# Patient Record
Sex: FEMALE | Race: WHITE | NOT HISPANIC OR LATINO | Employment: OTHER | URBAN - NONMETROPOLITAN AREA
[De-identification: names, ages, dates, MRNs, and addresses within clinical notes are randomized per-mention and may not be internally consistent; named-entity substitution may affect disease eponyms.]

---

## 2017-11-07 ENCOUNTER — OFFICE VISIT (OUTPATIENT)
Dept: OTOLARYNGOLOGY | Facility: CLINIC | Age: 67
End: 2017-11-07

## 2017-11-07 VITALS — WEIGHT: 207 LBS | HEIGHT: 61 IN | TEMPERATURE: 97.4 F | BODY MASS INDEX: 39.08 KG/M2

## 2017-11-07 DIAGNOSIS — R22.1 NECK MASS: Primary | ICD-10-CM

## 2017-11-07 DIAGNOSIS — H61.21 CERUMEN DEBRIS ON TYMPANIC MEMBRANE OF RIGHT EAR: ICD-10-CM

## 2017-11-07 DIAGNOSIS — H68.021 EUSTACHIAN SALPINGITIS, CHRONIC, RIGHT: ICD-10-CM

## 2017-11-07 PROCEDURE — 99204 OFFICE O/P NEW MOD 45 MIN: CPT | Performed by: OTOLARYNGOLOGY

## 2017-11-07 RX ORDER — LOSARTAN POTASSIUM 100 MG/1
TABLET ORAL
COMMUNITY
Start: 2017-11-06

## 2017-11-07 RX ORDER — LEVOTHYROXINE SODIUM 0.1 MG/1
100 TABLET ORAL DAILY
COMMUNITY
Start: 2017-09-26

## 2017-11-07 RX ORDER — LORATADINE 10 MG/1
10 TABLET ORAL NIGHTLY
COMMUNITY

## 2017-11-07 RX ORDER — ATORVASTATIN CALCIUM 10 MG/1
10 TABLET, FILM COATED ORAL NIGHTLY
COMMUNITY
Start: 2017-08-24

## 2017-11-07 RX ORDER — TRIAMTERENE AND HYDROCHLOROTHIAZIDE 37.5; 25 MG/1; MG/1
1 TABLET ORAL DAILY
COMMUNITY
Start: 2017-11-06

## 2017-11-07 RX ORDER — CLOPIDOGREL BISULFATE 75 MG/1
75 TABLET ORAL DAILY
COMMUNITY
Start: 2017-11-06

## 2017-11-07 RX ORDER — PHENOL 1.4 %
600 AEROSOL, SPRAY (ML) MUCOUS MEMBRANE 2 TIMES DAILY WITH MEALS
COMMUNITY

## 2017-11-07 NOTE — PROGRESS NOTES
Subjective   Leola Moore is a 67 y.o. female.   C:persistent right neck mass  History of Present Illness     Examination has a right neck mass was treated with antibiotics and may result slightlyat the same is nontender not painful she's not any fever chills.  She does have history of breast cancer.  She also has a history of ear problems 73 in her ear as well on the right side.  He's had trouble with wax buildup because a small ear canals.  She denies sore throat denies trouble swallowing she denies voice changes denies any dental or jaw or skin problems.  Has hearing loss or right ear had a tube placed about 8 years ago by another doctor.  Tube does not come out she's not had any drainage she has long-standing hearing loss slowly worsening  The following portions of the patient's history were reviewed and updated as appropriate: allergies, current medications, past family history, past medical history, past social history, past surgical history and problem list.      Leola Moore reports that she has never smoked. She has never used smokeless tobacco. She reports that she does not drink alcohol.  Patient is not a tobacco user and has been counseled for use of tobacco products    Family History   Problem Relation Age of Onset   • Heart disease Mother    • Heart disease Father          Current Outpatient Prescriptions:   •  atorvastatin (LIPITOR) 10 MG tablet, , Disp: , Rfl:   •  calcium carbonate (OS-ANNE MARIE) 600 MG tablet, Take 600 mg by mouth 2 (Two) Times a Day With Meals., Disp: , Rfl:   •  clopidogrel (PLAVIX) 75 MG tablet, , Disp: , Rfl:   •  levothyroxine (SYNTHROID, LEVOTHROID) 100 MCG tablet, , Disp: , Rfl:   •  loratadine (CLARITIN) 10 MG tablet, Take 10 mg by mouth Daily., Disp: , Rfl:   •  losartan (COZAAR) 100 MG tablet, , Disp: , Rfl:   •  Multiple Vitamins-Minerals (MULTIVITAMIN ADULT PO), Take  by mouth., Disp: , Rfl:   •  triamterene-hydrochlorothiazide (MAXZIDE-25) 37.5-25 MG per tablet, ,  Disp: , Rfl:     No Known Allergies    Past Medical History:   Diagnosis Date   • Breast cancer    • Disease of thyroid gland    • Stroke          Review of Systems   Constitutional: Negative for fever.   HENT: Positive for hearing loss. Negative for ear pain, facial swelling, trouble swallowing and voice change.    Neurological: Negative for facial asymmetry.   Hematological: Positive for adenopathy. Bruises/bleeds easily.   All other systems reviewed and are negative.          Objective   Physical Exam   Constitutional: She is oriented to person, place, and time. She appears well-developed and well-nourished.   HENT:   Head: Normocephalic and atraumatic.   Right Ear: Hearing and external ear normal. No mastoid tenderness. Tympanic membrane is not scarred.   Left Ear: Hearing, tympanic membrane, external ear and ear canal normal.   Ears:    Nose: Nose normal. No mucosal edema, rhinorrhea, nasal deformity or septal deviation. No epistaxis. Right sinus exhibits no maxillary sinus tenderness and no frontal sinus tenderness. Left sinus exhibits no maxillary sinus tenderness and no frontal sinus tenderness.   Mouth/Throat: Uvula is midline and oropharynx is clear and moist. She has dentures. No trismus in the jaw. Normal dentition. No lacerations or dental caries. No oropharyngeal exudate or posterior oropharyngeal edema. Tonsils are 2+ on the right. Tonsils are 2+ on the left. No tonsillar exudate.   Eyes: Conjunctivae and EOM are normal.   Neck: Normal range of motion. Neck supple. No JVD present. No tracheal deviation present. No thyromegaly present.   Cardiovascular: Normal rate.    Pulmonary/Chest: Effort normal.   Musculoskeletal: Normal range of motion.   Lymphadenopathy:        Head (right side): No submental, no submandibular, no tonsillar, no preauricular, no posterior auricular and no occipital adenopathy present.        Head (left side): No submental, no submandibular, no tonsillar, no preauricular, no  posterior auricular and no occipital adenopathy present.     She has no cervical adenopathy.        Right cervical: No superficial cervical, no deep cervical and no posterior cervical adenopathy present.       Left cervical: No superficial cervical, no deep cervical and no posterior cervical adenopathy present.   Neurological: She is alert and oriented to person, place, and time. No cranial nerve deficit.   Skin: Skin is warm.   Psychiatric: She has a normal mood and affect. Her speech is normal and behavior is normal. Thought content normal.   Nursing note and vitals reviewed.    Cerumen was removed from the ear canal with use of microscope and forceps without much difficulty      The previously done ultrasound which showed a large mass and what appeared to be several lymph nodes around the right sub-mental/submandibular area was reviewed.  Linings were discussed and reviewed with the patient and his and her   Assessment/Plan   Leola was seen today for swollen glands.    Diagnoses and all orders for this visit:    Neck mass  -     US Guided Fine Needle Aspiration; Future    Concern this may be a malignancy and that is very firm and  immobile  Explained her that there 3 options 1 is observation, number to this FNA, #3 is excisional biopsy.  If we can get the information need to find out with the cyst to begin treatment based on take that approach is at least invasive.  Discussed the risk of each approach there agree with this.  And some likely other antibiotics are likely be helpful.  I'm concerned because this is a nontender mass with no obvious source.  We discussed her ear tube an option for that I cleaned out the wax of ear and suggest that we do with the neck mass person and consideration of the PE tube removal was one option we discussed the consequences of that

## 2017-11-13 ENCOUNTER — HOSPITAL ENCOUNTER (OUTPATIENT)
Dept: ULTRASOUND IMAGING | Facility: HOSPITAL | Age: 67
Discharge: HOME | End: 2017-11-13
Admitting: OTOLARYNGOLOGY

## 2017-11-13 DIAGNOSIS — R22.1 NECK MASS: ICD-10-CM

## 2017-11-13 PROCEDURE — 88173 CYTOPATH EVAL FNA REPORT: CPT | Performed by: PATHOLOGY

## 2017-11-13 PROCEDURE — 76942 ECHO GUIDE FOR BIOPSY: CPT

## 2017-11-13 PROCEDURE — 88173 CYTOPATH EVAL FNA REPORT: CPT | Performed by: OTOLARYNGOLOGY

## 2017-11-13 NOTE — PRE-PROCEDURE NOTE
Patient presents for FNA of submandibular nodule. Procedure, risks , and benefits discussed with patient and patient  gave informed consent.  H&P dated 11/7/2017 reviewed with no changes.

## 2017-11-15 ENCOUNTER — PREP FOR SURGERY (OUTPATIENT)
Dept: OTHER | Facility: HOSPITAL | Age: 67
End: 2017-11-15

## 2017-11-15 ENCOUNTER — OFFICE VISIT (OUTPATIENT)
Dept: OTOLARYNGOLOGY | Facility: CLINIC | Age: 67
End: 2017-11-15

## 2017-11-15 VITALS — TEMPERATURE: 97 F | HEIGHT: 61 IN | BODY MASS INDEX: 39.46 KG/M2 | WEIGHT: 209 LBS

## 2017-11-15 DIAGNOSIS — C85.90 LYMPHOMA (HCC): ICD-10-CM

## 2017-11-15 DIAGNOSIS — C85.91 LYMPHOMA OF LYMPH NODES OF NECK, UNSPECIFIED LYMPHOMA TYPE (HCC): ICD-10-CM

## 2017-11-15 DIAGNOSIS — R22.1 NECK MASS: Primary | ICD-10-CM

## 2017-11-15 DIAGNOSIS — R59.0 CERVICAL ADENOPATHY: Primary | ICD-10-CM

## 2017-11-15 LAB
LAB AP CASE REPORT: NORMAL
LAB AP NON-GYN SPECIMEN ADEQUACY: NORMAL
Lab: NORMAL
PATH REPORT.FINAL DX SPEC: NORMAL
PATH REPORT.GROSS SPEC: NORMAL

## 2017-11-15 PROCEDURE — 99214 OFFICE O/P EST MOD 30 MIN: CPT | Performed by: OTOLARYNGOLOGY

## 2017-11-15 NOTE — PROGRESS NOTES
Subjective   Leola Moore is a 67 y.o. female.   F/u persistent right neck mass  History of Present Illness   Patient comes back in follow-up for persistent right neck mass.  She's had history of breast cancer in the past.  She is otherwise healthy doesn't seem to be have any major swallowing or voice problems doesn't have any other nodes she is aware of.  She underwent chemotherapy and radiation treatments in [place] the past and seen Dr. Apodaca's group.  His not noticed any progression the size of mass and recently when through the needle biopsy.   Comes back review of pathology.      The following portions of the patient's history were reviewed and updated as appropriate: allergies, current medications, past family history, past medical history, past social history, past surgical history and problem list.      Leola Moore reports that she has never smoked. She has never used smokeless tobacco. She reports that she does not drink alcohol.  Patient is not a tobacco user and has not been counseled for use of tobacco products    Family History   Problem Relation Age of Onset   • Heart disease Mother    • Heart disease Father          Current Outpatient Prescriptions:   •  atorvastatin (LIPITOR) 10 MG tablet, , Disp: , Rfl:   •  calcium carbonate (OS-ANNE MARIE) 600 MG tablet, Take 600 mg by mouth 2 (Two) Times a Day With Meals., Disp: , Rfl:   •  clopidogrel (PLAVIX) 75 MG tablet, , Disp: , Rfl:   •  levothyroxine (SYNTHROID, LEVOTHROID) 100 MCG tablet, , Disp: , Rfl:   •  loratadine (CLARITIN) 10 MG tablet, Take 10 mg by mouth Daily., Disp: , Rfl:   •  losartan (COZAAR) 100 MG tablet, , Disp: , Rfl:   •  Multiple Vitamins-Minerals (MULTIVITAMIN ADULT PO), Take  by mouth., Disp: , Rfl:   •  triamterene-hydrochlorothiazide (MAXZIDE-25) 37.5-25 MG per tablet, , Disp: , Rfl:     No Known Allergies    Past Medical History:   Diagnosis Date   • Breast cancer    • Disease of thyroid gland    • Stroke          Review of  Systems   Constitutional: Negative for fever.   HENT: Negative for ear pain, facial swelling, trouble swallowing and voice change.    Respiratory: Negative for shortness of breath.    Hematological: Positive for adenopathy.           Objective   Physical Exam   Constitutional: She is oriented to person, place, and time. She appears well-developed and well-nourished.   HENT:   Head: Normocephalic and atraumatic.   Right Ear: Hearing and external ear normal. No mastoid tenderness. Tympanic membrane is not scarred.   Left Ear: Hearing, tympanic membrane, external ear and ear canal normal.   Ears:    Nose: Nose normal. No mucosal edema, rhinorrhea, nasal deformity or septal deviation. No epistaxis. Right sinus exhibits no maxillary sinus tenderness and no frontal sinus tenderness. Left sinus exhibits no maxillary sinus tenderness and no frontal sinus tenderness.   Mouth/Throat: Uvula is midline and oropharynx is clear and moist. She has dentures. No trismus in the jaw. Normal dentition. No lacerations or dental caries. No oropharyngeal exudate or posterior oropharyngeal edema. Tonsils are 2+ on the right. Tonsils are 2+ on the left. No tonsillar exudate.   Eyes: Conjunctivae and EOM are normal.   Neck: Trachea normal, normal range of motion and phonation normal. Neck supple. No JVD present. No tracheal deviation present. No thyroid mass and no thyromegaly present.   Cardiovascular: Normal rate.    Pulmonary/Chest: Effort normal.   Musculoskeletal: Normal range of motion.   Lymphadenopathy:        Head (right side): Submental and submandibular adenopathy present. No tonsillar, no preauricular, no posterior auricular and no occipital adenopathy present.        Head (left side): No submental, no submandibular, no tonsillar, no preauricular, no posterior auricular and no occipital adenopathy present.     She has cervical adenopathy.        Right cervical: Deep cervical adenopathy present. No superficial cervical and no  posterior cervical adenopathy present.       Left cervical: No superficial cervical, no deep cervical and no posterior cervical adenopathy present.   Neurological: She is alert and oriented to person, place, and time. No cranial nerve deficit.   Skin: Skin is warm.   Psychiatric: She has a normal mood and affect. Her speech is normal and behavior is normal. Thought content normal.   Nursing note and vitals reviewed.    OR Specimen ID: [id number]  Specimen Description: right neck   Fine Needle Aspiration - Fine Needle Aspirate, Neck   Order: [id number]   Status:  Final result   Visible to patient:  No (Not Released) Dx:  Neck mass      2d ago     Final Diagnosis   Mass, Right Neck, FNA:     Few, isolated, ATYPICAL LYMPHOID CELLS suspicious for a LYMPHOMA     Further evaluation is recommended           Electronically signed by Tyree Sevilla MD on 11/15/2017 at 0823   Specimen Adequacy Satisfactory for evaluation   Gross Description       Fine Needle Aspiration smears. No cell block      Resulting Agency [facility]      Specimen Collected: 11/13/17  2:05 PM Last Resulted: 11/15/17  8:23 AM                Scans on Order [id number]   Document on 11/15/2017  9:24 AM by Tyree Sevilla MD                         Assessment/Plan   Leola was seen today for follow-up.    Diagnoses and all orders for this visit:    Neck mass    Lymphoma of lymph nodes of neck, unspecified lymphoma type      I discussed the findings with the patient explained her that we need additional tissue to confirm the diagnosis and findings tissue type.  Discussed the surgeries not the treatment for this that is required to get an adequate tissue.  Then an oncologist would make decisions about what type of treatment options with a chemoradiation therapy or other treatments would be appropriate.  Discussed the risk of surgery the risk of bleeding, infection scarring facial paralysis numbness need for further surgery nerve need for further treatment. The plan  is to excise with the deep nodes in the to  submental area under general anesthesia with nerve dissection of possible.  The patient tissue be sent off and she will be referred appropriate oncology as needed.  Understand what's involved the risks benefits, occasions discuss her family and all questions answered she prefers to see her her oncologist she she seen for breast cancer in the past and will arrange that once we have the tissue diagnosis are arranging in the near future

## 2017-11-17 ENCOUNTER — APPOINTMENT (OUTPATIENT)
Dept: PREADMISSION TESTING | Facility: HOSPITAL | Age: 67
End: 2017-11-17

## 2017-11-17 VITALS
HEIGHT: 61 IN | WEIGHT: 205 LBS | SYSTOLIC BLOOD PRESSURE: 100 MMHG | HEART RATE: 79 BPM | OXYGEN SATURATION: 96 % | DIASTOLIC BLOOD PRESSURE: 72 MMHG | RESPIRATION RATE: 14 BRPM | BODY MASS INDEX: 38.71 KG/M2

## 2017-11-17 LAB
ANION GAP SERPL CALCULATED.3IONS-SCNC: 11 MMOL/L (ref 5–15)
BUN BLD-MCNC: 20 MG/DL (ref 7–21)
BUN/CREAT SERPL: 22.2 (ref 7–25)
CALCIUM SPEC-SCNC: 9.7 MG/DL (ref 8.4–10.2)
CHLORIDE SERPL-SCNC: 104 MMOL/L (ref 95–110)
CO2 SERPL-SCNC: 22 MMOL/L (ref 22–31)
CREAT BLD-MCNC: 0.9 MG/DL (ref 0.5–1)
GFR SERPL CREATININE-BSD FRML MDRD: 63 ML/MIN/1.73 (ref 45–104)
GLUCOSE BLD-MCNC: 102 MG/DL (ref 60–100)
POTASSIUM BLD-SCNC: 3.9 MMOL/L (ref 3.5–5.1)
SODIUM BLD-SCNC: 137 MMOL/L (ref 137–145)

## 2017-11-17 PROCEDURE — 93010 ELECTROCARDIOGRAM REPORT: CPT | Performed by: INTERNAL MEDICINE

## 2017-11-17 PROCEDURE — 93005 ELECTROCARDIOGRAM TRACING: CPT

## 2017-11-17 PROCEDURE — 36415 COLL VENOUS BLD VENIPUNCTURE: CPT

## 2017-11-17 PROCEDURE — 80048 BASIC METABOLIC PNL TOTAL CA: CPT | Performed by: ANESTHESIOLOGY

## 2017-11-17 RX ORDER — SODIUM CHLORIDE 9 MG/ML
1000 INJECTION, SOLUTION INTRAVENOUS CONTINUOUS PRN
Status: CANCELLED | OUTPATIENT
Start: 2017-11-21

## 2017-11-17 RX ORDER — MOMETASONE FUROATE 1 MG/G
CREAM TOPICAL DAILY
COMMUNITY

## 2017-11-17 NOTE — DISCHARGE INSTRUCTIONS
[facility]  Pre-op Information and Guidelines    You will be called after 2 p.m. the day before your surgery (Friday for Monday surgery) and notified of your time for arrival and approximate surgery time.  If you have not received a call by 4P.M., please contact Same Day Surgery at [phone] of if outside [facility] call [phone].    Please Follow these Important Safety Guidelines:    • The morning of your procedure, take only the medications listed below with   A sip of water:_____________________________________________       ____LEVOTHYROXINE__________________________    • DO NOT eat or drink anything after 12:00 midnight the night before surgery  Specific instructions concerning drinking clear liquids will be discussed during  the pre-surgery instruction call the day before your surgery.    • If you take a blood thinner (ex. Plavix, Coumadin, aspirin), ask your doctor when to stop it before surgery  STOP DATE: _________________    • Only 2 visitors are allowed in patient rooms at a time  Your visitors will be asked to wait in the lobby until the admission process is complete with the exception of a parent with a child and patients in need of special assistance.    • YOU CANNOT DRIVE YOURSELF HOME  You must be accompanied by someone who will be responsible for driving you home after surgery and for your care at home.    • DO NOT chew gum, use breath mints, hard candy, or smoke the day of surgery  • DO NOT drink alcohol for at least 24 hours before your surgery  • DO NOT wear any jewelry and remove all body piercing before coming to the hospital  • DO NOT wear make-up to the hospital  • If you are having surgery on an extremity (arm/leg/foot) remove nail polish/artificial nails on the surgical side  • Clothing, glasses, contacts, dentures, and hairpieces must be removed before surgery  • Bathe the night before or the morning of your surgery and do not use powders/lotions on  skin.

## 2017-11-20 ENCOUNTER — ANESTHESIA EVENT (OUTPATIENT)
Dept: PERIOP | Facility: HOSPITAL | Age: 67
End: 2017-11-20

## 2017-11-21 ENCOUNTER — HOSPITAL ENCOUNTER (OUTPATIENT)
Facility: HOSPITAL | Age: 67
Setting detail: HOSPITAL OUTPATIENT SURGERY
Discharge: HOME | End: 2017-11-21
Attending: OTOLARYNGOLOGY | Admitting: OTOLARYNGOLOGY

## 2017-11-21 ENCOUNTER — ANESTHESIA (OUTPATIENT)
Dept: PERIOP | Facility: HOSPITAL | Age: 67
End: 2017-11-21

## 2017-11-21 VITALS
HEART RATE: 86 BPM | WEIGHT: 207.23 LBS | DIASTOLIC BLOOD PRESSURE: 83 MMHG | TEMPERATURE: 96.9 F | RESPIRATION RATE: 18 BRPM | BODY MASS INDEX: 39.13 KG/M2 | HEIGHT: 61 IN | SYSTOLIC BLOOD PRESSURE: 131 MMHG | OXYGEN SATURATION: 96 %

## 2017-11-21 DIAGNOSIS — C85.90 LYMPHOMA (HCC): ICD-10-CM

## 2017-11-21 DIAGNOSIS — R59.0 CERVICAL ADENOPATHY: ICD-10-CM

## 2017-11-21 PROCEDURE — 88323 CONSLTJ&REPRT MATRL PREP SLD: CPT

## 2017-11-21 PROCEDURE — 25010000002 EPINEPHRINE PF 1 MG/10ML SOLUTION PREFILLED SYRINGE: Performed by: OTOLARYNGOLOGY

## 2017-11-21 PROCEDURE — 88305 TISSUE EXAM BY PATHOLOGIST: CPT | Performed by: OTOLARYNGOLOGY

## 2017-11-21 PROCEDURE — 88342 IMHCHEM/IMCYTCHM 1ST ANTB: CPT | Performed by: PATHOLOGY

## 2017-11-21 PROCEDURE — 88312 SPECIAL STAINS GROUP 1: CPT | Performed by: PATHOLOGY

## 2017-11-21 PROCEDURE — 38510 BIOPSY/REMOVAL LYMPH NODES: CPT | Performed by: OTOLARYNGOLOGY

## 2017-11-21 PROCEDURE — 88365 INSITU HYBRIDIZATION (FISH): CPT

## 2017-11-21 PROCEDURE — 25010000002 MIDAZOLAM PER 1 MG: Performed by: NURSE ANESTHETIST, CERTIFIED REGISTERED

## 2017-11-21 PROCEDURE — 88312 SPECIAL STAINS GROUP 1: CPT | Performed by: OTOLARYNGOLOGY

## 2017-11-21 PROCEDURE — 25010000002 PROPOFOL 10 MG/ML EMULSION: Performed by: NURSE ANESTHETIST, CERTIFIED REGISTERED

## 2017-11-21 PROCEDURE — 88342 IMHCHEM/IMCYTCHM 1ST ANTB: CPT | Performed by: OTOLARYNGOLOGY

## 2017-11-21 PROCEDURE — 80500 TISSUE PATHOLOGY EXAM: CPT | Performed by: PATHOLOGY

## 2017-11-21 PROCEDURE — 88341 IMHCHEM/IMCYTCHM EA ADD ANTB: CPT

## 2017-11-21 PROCEDURE — 25010000002 FENTANYL CITRATE (PF) 100 MCG/2ML SOLUTION: Performed by: NURSE ANESTHETIST, CERTIFIED REGISTERED

## 2017-11-21 PROCEDURE — 88360 TUMOR IMMUNOHISTOCHEM/MANUAL: CPT

## 2017-11-21 PROCEDURE — 88342 IMHCHEM/IMCYTCHM 1ST ANTB: CPT

## 2017-11-21 PROCEDURE — 88307 TISSUE EXAM BY PATHOLOGIST: CPT | Performed by: PATHOLOGY

## 2017-11-21 RX ORDER — DIPHENHYDRAMINE HYDROCHLORIDE 50 MG/ML
12.5 INJECTION INTRAMUSCULAR; INTRAVENOUS
Status: DISCONTINUED | OUTPATIENT
Start: 2017-11-21 | End: 2017-11-21 | Stop reason: HOSPADM

## 2017-11-21 RX ORDER — ACETAMINOPHEN 325 MG/1
650 TABLET ORAL ONCE AS NEEDED
Status: DISCONTINUED | OUTPATIENT
Start: 2017-11-21 | End: 2017-11-21 | Stop reason: HOSPADM

## 2017-11-21 RX ORDER — FLUMAZENIL 0.1 MG/ML
0.2 INJECTION INTRAVENOUS AS NEEDED
Status: DISCONTINUED | OUTPATIENT
Start: 2017-11-21 | End: 2017-11-21 | Stop reason: HOSPADM

## 2017-11-21 RX ORDER — EPHEDRINE SULFATE 50 MG/ML
5 INJECTION, SOLUTION INTRAVENOUS ONCE AS NEEDED
Status: DISCONTINUED | OUTPATIENT
Start: 2017-11-21 | End: 2017-11-21 | Stop reason: HOSPADM

## 2017-11-21 RX ORDER — MEPERIDINE HYDROCHLORIDE 50 MG/ML
12.5 INJECTION INTRAMUSCULAR; INTRAVENOUS; SUBCUTANEOUS
Status: DISCONTINUED | OUTPATIENT
Start: 2017-11-21 | End: 2017-11-21 | Stop reason: HOSPADM

## 2017-11-21 RX ORDER — ONDANSETRON 2 MG/ML
4 INJECTION INTRAMUSCULAR; INTRAVENOUS ONCE AS NEEDED
Status: DISCONTINUED | OUTPATIENT
Start: 2017-11-21 | End: 2017-11-21 | Stop reason: HOSPADM

## 2017-11-21 RX ORDER — SODIUM CHLORIDE 9 MG/ML
1000 INJECTION, SOLUTION INTRAVENOUS CONTINUOUS PRN
Status: DISCONTINUED | OUTPATIENT
Start: 2017-11-21 | End: 2017-11-21 | Stop reason: HOSPADM

## 2017-11-21 RX ORDER — BACITRACIN ZINC 500 [USP'U]/G
OINTMENT TOPICAL AS NEEDED
Status: DISCONTINUED | OUTPATIENT
Start: 2017-11-21 | End: 2017-11-21 | Stop reason: HOSPADM

## 2017-11-21 RX ORDER — ACETAMINOPHEN 650 MG/1
650 SUPPOSITORY RECTAL ONCE AS NEEDED
Status: DISCONTINUED | OUTPATIENT
Start: 2017-11-21 | End: 2017-11-21 | Stop reason: HOSPADM

## 2017-11-21 RX ORDER — HYDROCODONE BITARTRATE AND ACETAMINOPHEN 5; 325 MG/1; MG/1
1-2 TABLET ORAL EVERY 4 HOURS PRN
Qty: 20 TABLET | Refills: 0 | Status: SHIPPED | OUTPATIENT
Start: 2017-11-21 | End: 2017-11-29

## 2017-11-21 RX ORDER — NALOXONE HCL 0.4 MG/ML
0.2 VIAL (ML) INJECTION AS NEEDED
Status: DISCONTINUED | OUTPATIENT
Start: 2017-11-21 | End: 2017-11-21 | Stop reason: HOSPADM

## 2017-11-21 RX ORDER — MIDAZOLAM HYDROCHLORIDE 1 MG/ML
INJECTION INTRAMUSCULAR; INTRAVENOUS AS NEEDED
Status: DISCONTINUED | OUTPATIENT
Start: 2017-11-21 | End: 2017-11-21 | Stop reason: SURG

## 2017-11-21 RX ORDER — CEPHALEXIN 500 MG/1
500 CAPSULE ORAL 4 TIMES DAILY
Qty: 28 CAPSULE | Refills: 0 | Status: SHIPPED | OUTPATIENT
Start: 2017-11-21 | End: 2017-11-29

## 2017-11-21 RX ORDER — HYDROCODONE BITARTRATE AND ACETAMINOPHEN 5; 325 MG/1; MG/1
1 TABLET ORAL EVERY 4 HOURS PRN
Status: DISCONTINUED | OUTPATIENT
Start: 2017-11-21 | End: 2017-11-21 | Stop reason: HOSPADM

## 2017-11-21 RX ORDER — LIDOCAINE HYDROCHLORIDE 20 MG/ML
INJECTION, SOLUTION INFILTRATION; PERINEURAL AS NEEDED
Status: DISCONTINUED | OUTPATIENT
Start: 2017-11-21 | End: 2017-11-21 | Stop reason: SURG

## 2017-11-21 RX ORDER — LABETALOL HYDROCHLORIDE 5 MG/ML
5 INJECTION, SOLUTION INTRAVENOUS
Status: DISCONTINUED | OUTPATIENT
Start: 2017-11-21 | End: 2017-11-21 | Stop reason: HOSPADM

## 2017-11-21 RX ORDER — FENTANYL CITRATE 50 UG/ML
INJECTION, SOLUTION INTRAMUSCULAR; INTRAVENOUS AS NEEDED
Status: DISCONTINUED | OUTPATIENT
Start: 2017-11-21 | End: 2017-11-21 | Stop reason: SURG

## 2017-11-21 RX ORDER — PROPOFOL 10 MG/ML
VIAL (ML) INTRAVENOUS AS NEEDED
Status: DISCONTINUED | OUTPATIENT
Start: 2017-11-21 | End: 2017-11-21 | Stop reason: SURG

## 2017-11-21 RX ADMIN — PROPOFOL 50 MG: 10 INJECTION, EMULSION INTRAVENOUS at 14:02

## 2017-11-21 RX ADMIN — MIDAZOLAM 2 MG: 1 INJECTION INTRAMUSCULAR; INTRAVENOUS at 13:21

## 2017-11-21 RX ADMIN — PROPOFOL 50 MG: 10 INJECTION, EMULSION INTRAVENOUS at 13:36

## 2017-11-21 RX ADMIN — SODIUM CHLORIDE 1000 ML: 9 INJECTION, SOLUTION INTRAVENOUS at 12:32

## 2017-11-21 RX ADMIN — LIDOCAINE HYDROCHLORIDE 100 MG: 20 INJECTION, SOLUTION INFILTRATION; PERINEURAL at 13:34

## 2017-11-21 RX ADMIN — FENTANYL CITRATE 50 MCG: 50 INJECTION, SOLUTION INTRAMUSCULAR; INTRAVENOUS at 13:29

## 2017-11-21 RX ADMIN — PROPOFOL 150 MG: 10 INJECTION, EMULSION INTRAVENOUS at 13:34

## 2017-11-21 RX ADMIN — PROPOFOL 50 MG: 10 INJECTION, EMULSION INTRAVENOUS at 13:53

## 2017-11-21 RX ADMIN — FENTANYL CITRATE 50 MCG: 50 INJECTION, SOLUTION INTRAMUSCULAR; INTRAVENOUS at 13:39

## 2017-11-21 RX ADMIN — PROPOFOL 100 MG: 10 INJECTION, EMULSION INTRAVENOUS at 13:38

## 2017-11-21 NOTE — ANESTHESIA PREPROCEDURE EVALUATION
Anesthesia Evaluation     Patient summary reviewed and Nursing notes reviewed   NPO Solid Status: > 8 hours  NPO Liquid Status: > 4 hours     Airway   Mallampati: II  TM distance: <3 FB  Neck ROM: full  possible difficult intubation and small opening  Dental    (+) edentulous    Pulmonary - negative pulmonary ROS and normal exam    breath sounds clear to auscultation  Cardiovascular - normal exam    ECG reviewed  PT is on anticoagulation therapy  Rhythm: regular  Rate: normal    (+) hypertension,   (-) carotid bruits    ROS comment: EKG:NSR    Neuro/Psych  (+) CVA residual symptoms,    GI/Hepatic/Renal/Endo    (+) obesity,  hypothyroidism,     Musculoskeletal (-) negative ROS    Abdominal   (+) obese,    Substance History - negative use     OB/GYN negative ob/gyn ROS         Other      history of cancer (Breast cancer. Right mastectomy 2008.)                                    Anesthesia Plan    ASA 3     general     intravenous induction   Anesthetic plan and risks discussed with patient and spouse/significant other.

## 2017-11-21 NOTE — ANESTHESIA POSTPROCEDURE EVALUATION
Patient: Leola Moore    Procedure Summary     Date Anesthesia Start Anesthesia Stop Room / Location    11/21/17 1323 0706  [facility] OR 08 / [facility]       Procedure Diagnosis Surgeon Provider    RIGHT DEEP CERVICAL LYMPH NODE EXCISION      (need to be last case) (Right Neck) Lymphoma; Cervical adenopathy  (Lymphoma [C85.90]; Cervical adenopathy [R59.0]) MD Sesar Parker MD          Anesthesia Type: general  Last vitals  BP   123/64 (11/21/17 1234)   Temp   98.6 °F (37 °C) (11/21/17 1234)   Pulse   65 (11/21/17 1234)   Resp   18 (11/21/17 1234)     SpO2   96 % (11/21/17 1234)     Post Anesthesia Care and Evaluation    Patient location during evaluation: PACU  Patient participation: complete - patient participated  Level of consciousness: awake  Pain score: 3  Pain management: adequate  Airway patency: patent  Anesthetic complications: No anesthetic complications  PONV Status: none  Cardiovascular status: acceptable  Respiratory status: acceptable  Hydration status: acceptable

## 2017-11-29 ENCOUNTER — OFFICE VISIT (OUTPATIENT)
Dept: OTOLARYNGOLOGY | Facility: CLINIC | Age: 67
End: 2017-11-29

## 2017-11-29 VITALS — TEMPERATURE: 98.2 F | BODY MASS INDEX: 39.08 KG/M2 | WEIGHT: 207 LBS | HEIGHT: 61 IN

## 2017-11-29 DIAGNOSIS — R22.1 NECK MASS: Primary | ICD-10-CM

## 2017-11-29 PROCEDURE — 99024 POSTOP FOLLOW-UP VISIT: CPT | Performed by: OTOLARYNGOLOGY

## 2017-11-29 NOTE — PROGRESS NOTES
Patient comes back in follow-up of vulvar deep neck biopsy.  7 mild soreness but no functional problems.  Examination reveals no evidence infection or tumor or mass.  Facial nerve function is normal  Her bruising going down she's not tickly tender.    The pathology report below surprisingly but fortunately there is no lymphoma a necrotizing lymphadenitis.  Digits tissue sent for second opinion at the [facility].    .       8d ago     Clinical Information       RIGHT NECK MASS POSSIBLE LYMPHOMA: FRESH   Final Diagnosis      To follow  Case sent to [facility] for review      Electronically signed by Tyree Sevilla MD on 11/29/2017 at 0958   Preliminary Diagnosis       Right Neck Mass, Excisional Biopsy:     Focally necrotizing, reactive lymphadenitis     Stains for fungus and acid fast organisms are NEGATIVE   Gross Description       The specimen consists of an oval shaped lymph node measuring 2.5 x 2.0 x 1.3 cm.  It is well encapsulated.  Serially sectioned and all embedded.    Special Stains       GMS NEGATIVE  ACID FAST NEGATIVE  BCL 2 NEGATIVE      Resulting Agency [facility]      Specimen Collected: 11/21/17  2:17 PM Last Resulted: 11/29/17  9:58 AM                Scans on Order [id number]   Document on 11/29/2017 10:59 AM by Tyree Sevilla MD      Call the patient once the final report comes back for the [facility].  Though with these results.  His healing well without evidence of significant complication.  See her back in 3 weeks call her when the results are available.  EZIO De La Cruz M.D.         SEE above

## 2017-12-18 ENCOUNTER — TELEPHONE (OUTPATIENT)
Dept: OTOLARYNGOLOGY | Facility: CLINIC | Age: 67
End: 2017-12-18

## 2017-12-18 NOTE — TELEPHONE ENCOUNTER
We received the final report from [facility] who agreed that this was a noncancerous lymph node and inflammation.  Next discussed this the patient she'll follow up as scheduled later in the week    EZIO De La Cruz MD.

## 2017-12-19 LAB
DX PRELIMINARY: NORMAL
LAB AP CASE REPORT: NORMAL
LAB AP CLINICAL INFORMATION: NORMAL
LAB AP SPECIAL STAINS: NORMAL
Lab: NORMAL
PATH REPORT.ADDENDUM SPEC: NORMAL
PATH REPORT.FINAL DX SPEC: NORMAL
PATH REPORT.GROSS SPEC: NORMAL

## 2017-12-20 ENCOUNTER — OFFICE VISIT (OUTPATIENT)
Dept: OTOLARYNGOLOGY | Facility: CLINIC | Age: 67
End: 2017-12-20

## 2017-12-20 VITALS — BODY MASS INDEX: 39.11 KG/M2 | WEIGHT: 207.14 LBS | TEMPERATURE: 97.5 F | HEIGHT: 61 IN

## 2017-12-20 DIAGNOSIS — R22.1 NECK MASS: Primary | ICD-10-CM

## 2017-12-20 PROCEDURE — 99024 POSTOP FOLLOW-UP VISIT: CPT | Performed by: OTOLARYNGOLOGY

## 2017-12-20 NOTE — PROGRESS NOTES
Patient has back in follow-up her pathology report from the [facility] confirm that there was no evidence of lymphoma.  This unusual medical inflammation possibly related Lona-Barr virus, but they did not feel like this is a cancerous lesion.  She's healing well her wounds healing without significant scarring.  I trimmed some additional sutures off the surface.  She is to call for any new problems but otherwise will see her as needed I gave her a card she has some new areas developed a think it highly unlikely based on the pathology findings.  We will see her as needed.     4wk ago     Addendum   FINAL DIAGNOSIS FROM [facility] READS AS FOLLOWS:   SOFT TISSUE MASS, RIGHT NECK, EXCISIONAL BIOPSY ([id number]; 11/21/2017):    CHRONIC SIALADENITIS WITH REACTIVE LYMPHOID HYPERPLASIA, FIBROSIS, FOCAL NECROTIZING GRANULOMATOUS INFLAMMATION, AND FOCAL INCREASE IN EBV-POSITIVE CELLS.   SEE SCANNED [facility] REPORT.    Addendum electronically signed by Tyree Sevilla MD on 12/19/2017 at 1326   Clinical Information See result below   RIGHT NECK MASS POSSIBLE LYMPHOMA: FRESH   Final Diagnosis      To follow  Case sent to [facility] for review      Electronically signed by Tyree Sevilla MD on 11/29/2017 at 0906   Preliminary Diagnosis See result below   Right Neck Mass, Excisional Biopsy:     Focally necrotizing, reactive lymphadenitis     Stains for fungus and acid fast organisms are NEGATIVE   Gross Description See result below   The specimen consists of an oval shaped lymph node measuring 2.5 x 2.0 x 1.3 cm.  It is          EZIO De La Cruz M.D.

## (undated) DEVICE — SUT GUT CHRM 4/0 SH1 27IN G181H

## (undated) DEVICE — SUT SILK 3-0 TIES 18IN SA64H

## (undated) DEVICE — GLV SURG SENSICARE GREEN W/ALOE PF LF 7 STRL

## (undated) DEVICE — GLV SURG TRIUMPH LT PF LTX 8 STRL

## (undated) DEVICE — MARKR SKIN W/RULR AND LBL

## (undated) DEVICE — SUT SILK B SUTUPK 2/0 18IN SA65H

## (undated) DEVICE — SPNG GZ WOVN 4X4IN 12PLY 10/BX STRL

## (undated) DEVICE — ANTIBACTERIAL UNDYED BRAIDED (POLYGLACTIN 910), SYNTHETIC ABSORBABLE SUTURE: Brand: COATED VICRYL

## (undated) DEVICE — GLV SURG TRIUMPH LT PF LTX 7.5 STRL

## (undated) DEVICE — SUT SILK 4/0 18IN SA63H

## (undated) DEVICE — PENCL E/S HNDSWCH PUSHBTN HOLSTR 10FT

## (undated) DEVICE — ADAPT CANCER LUER STUB 18G

## (undated) DEVICE — GOWN,AURORA,NOREINF,RAGLAN,XL,STERILE: Brand: MEDLINE

## (undated) DEVICE — SYR LL TP 10ML STRL

## (undated) DEVICE — INTENDED FOR TISSUE SEPARATION, AND OTHER PROCEDURES THAT REQUIRE A SHARP SURGICAL BLADE TO PUNCTURE OR CUT.: Brand: BARD-PARKER ® CARBON RIB-BACK BLADES

## (undated) DEVICE — SOL IRR NACL 0.9PCT BT 1000ML

## (undated) DEVICE — ELECTRD BLD EZ CLN MOD 2.5IN

## (undated) DEVICE — GLV SURG SENSICARE GREEN W/ALOE PF LF 6.5 STRL

## (undated) DEVICE — DISPOSABLE BIPOLAR CODE, 12' (3.66 M): Brand: CONMED

## (undated) DEVICE — TRY IRR

## (undated) DEVICE — PAD GRND REM POLYHESIVE A/ DISP

## (undated) DEVICE — TP SXN YANKR BLB TIP W/TBG 10F LF STRL

## (undated) DEVICE — PK ENT LF 60

## (undated) DEVICE — PREP SOL POVIDONE/IODINE BT 4OZ

## (undated) DEVICE — SPNG DISSCT SECTO KTTNER PK/5

## (undated) DEVICE — STIMULATOR 8562010 VARI-STIM 10PK ROHS: Brand: VARI-STIM®

## (undated) DEVICE — CONTAINER,SPECIMEN,OR STERILE,4OZ: Brand: MEDLINE